# Patient Record
Sex: FEMALE | URBAN - METROPOLITAN AREA
[De-identification: names, ages, dates, MRNs, and addresses within clinical notes are randomized per-mention and may not be internally consistent; named-entity substitution may affect disease eponyms.]

---

## 2017-10-12 ENCOUNTER — EMERGENCY (EMERGENCY)
Facility: HOSPITAL | Age: 32
LOS: 1 days | Discharge: HOME | End: 2017-10-12
Attending: EMERGENCY MEDICINE
Payer: MEDICAID

## 2017-10-12 VITALS
TEMPERATURE: 98 F | SYSTOLIC BLOOD PRESSURE: 114 MMHG | HEIGHT: 66 IN | WEIGHT: 263.89 LBS | OXYGEN SATURATION: 100 % | RESPIRATION RATE: 18 BRPM | DIASTOLIC BLOOD PRESSURE: 68 MMHG | HEART RATE: 67 BPM

## 2017-10-12 PROCEDURE — 99283 EMERGENCY DEPT VISIT LOW MDM: CPT

## 2017-10-12 RX ORDER — LORATADINE 10 MG/1
1 TABLET ORAL
Qty: 15 | Refills: 0 | OUTPATIENT
Start: 2017-10-12 | End: 2017-10-27

## 2017-10-12 RX ORDER — IBUPROFEN 200 MG
600 TABLET ORAL ONCE
Qty: 0 | Refills: 0 | Status: COMPLETED | OUTPATIENT
Start: 2017-10-12 | End: 2017-10-12

## 2017-10-12 RX ORDER — AMOXICILLIN 250 MG/5ML
1 SUSPENSION, RECONSTITUTED, ORAL (ML) ORAL
Qty: 30 | Refills: 0 | OUTPATIENT
Start: 2017-10-12 | End: 2017-10-22

## 2017-10-12 RX ADMIN — Medication 600 MILLIGRAM(S): at 22:12

## 2017-10-12 RX ADMIN — Medication 600 MILLIGRAM(S): at 22:43

## 2017-10-12 NOTE — ED ADULT NURSE NOTE - OBJECTIVE STATEMENT
AOX3 +ambulatory patient reports body aches, cough, headache, nasal congestion and fever x monday. Patient denies any recent travels.

## 2017-10-15 DIAGNOSIS — R05 COUGH: ICD-10-CM

## 2017-10-15 DIAGNOSIS — J32.9 CHRONIC SINUSITIS, UNSPECIFIED: ICD-10-CM

## 2021-04-29 NOTE — ED ADULT NURSE NOTE - CAS ELECT INFOMATION PROVIDED
Has Your Skin Lesion Been Treated?: not been treated
Is This A New Presentation, Or A Follow-Up?: Subcutaneous Nodules
DC instructions

## 2025-05-14 ENCOUNTER — INPATIENT (INPATIENT)
Facility: HOSPITAL | Age: 40
LOS: 0 days | Discharge: HOME | DRG: 392 | End: 2025-05-15
Attending: OBSTETRICS & GYNECOLOGY | Admitting: OBSTETRICS & GYNECOLOGY
Payer: SELF-PAY

## 2025-05-14 VITALS
HEART RATE: 88 BPM | HEIGHT: 65 IN | SYSTOLIC BLOOD PRESSURE: 120 MMHG | DIASTOLIC BLOOD PRESSURE: 88 MMHG | TEMPERATURE: 99 F | OXYGEN SATURATION: 100 % | WEIGHT: 214.95 LBS | RESPIRATION RATE: 18 BRPM

## 2025-05-14 DIAGNOSIS — Z98.84 BARIATRIC SURGERY STATUS: Chronic | ICD-10-CM

## 2025-05-14 DIAGNOSIS — R19.00 INTRA-ABDOMINAL AND PELVIC SWELLING, MASS AND LUMP, UNSPECIFIED SITE: ICD-10-CM

## 2025-05-14 DIAGNOSIS — Z90.3 ACQUIRED ABSENCE OF STOMACH [PART OF]: Chronic | ICD-10-CM

## 2025-05-14 DIAGNOSIS — D25.9 LEIOMYOMA OF UTERUS, UNSPECIFIED: ICD-10-CM

## 2025-05-14 LAB
ALBUMIN SERPL ELPH-MCNC: 3.3 G/DL — LOW (ref 3.5–5)
ALP SERPL-CCNC: 46 U/L — SIGNIFICANT CHANGE UP (ref 40–120)
ALT FLD-CCNC: 15 U/L DA — SIGNIFICANT CHANGE UP (ref 10–60)
ANION GAP SERPL CALC-SCNC: 7 MMOL/L — SIGNIFICANT CHANGE UP (ref 5–17)
ANISOCYTOSIS BLD QL: SLIGHT — SIGNIFICANT CHANGE UP
APPEARANCE UR: CLEAR — SIGNIFICANT CHANGE UP
AST SERPL-CCNC: 17 U/L — SIGNIFICANT CHANGE UP (ref 10–40)
BACTERIA # UR AUTO: NEGATIVE /HPF — SIGNIFICANT CHANGE UP
BASOPHILS # BLD AUTO: 0.02 K/UL — SIGNIFICANT CHANGE UP (ref 0–0.2)
BASOPHILS NFR BLD AUTO: 0.3 % — SIGNIFICANT CHANGE UP (ref 0–2)
BILIRUB SERPL-MCNC: 0.7 MG/DL — SIGNIFICANT CHANGE UP (ref 0.2–1.2)
BILIRUB UR-MCNC: NEGATIVE — SIGNIFICANT CHANGE UP
BUN SERPL-MCNC: 13 MG/DL — SIGNIFICANT CHANGE UP (ref 7–18)
CALCIUM SERPL-MCNC: 8.6 MG/DL — SIGNIFICANT CHANGE UP (ref 8.4–10.5)
CHLORIDE SERPL-SCNC: 105 MMOL/L — SIGNIFICANT CHANGE UP (ref 96–108)
CO2 SERPL-SCNC: 23 MMOL/L — SIGNIFICANT CHANGE UP (ref 22–31)
COLOR SPEC: SIGNIFICANT CHANGE UP
CREAT SERPL-MCNC: 0.7 MG/DL — SIGNIFICANT CHANGE UP (ref 0.5–1.3)
DACRYOCYTES BLD QL SMEAR: SLIGHT — SIGNIFICANT CHANGE UP
DIFF PNL FLD: NEGATIVE — SIGNIFICANT CHANGE UP
EGFR: 111 ML/MIN/1.73M2 — SIGNIFICANT CHANGE UP
EGFR: 111 ML/MIN/1.73M2 — SIGNIFICANT CHANGE UP
ELLIPTOCYTES BLD QL SMEAR: SLIGHT — SIGNIFICANT CHANGE UP
EOSINOPHIL # BLD AUTO: 0.02 K/UL — SIGNIFICANT CHANGE UP (ref 0–0.5)
EOSINOPHIL NFR BLD AUTO: 0.3 % — SIGNIFICANT CHANGE UP (ref 0–6)
EPI CELLS # UR: PRESENT
GLUCOSE SERPL-MCNC: 100 MG/DL — HIGH (ref 70–99)
GLUCOSE UR QL: NEGATIVE MG/DL — SIGNIFICANT CHANGE UP
HCG SERPL-ACNC: <1 MIU/ML — SIGNIFICANT CHANGE UP
HCT VFR BLD CALC: 32.3 % — LOW (ref 34.5–45)
HGB BLD-MCNC: 10.1 G/DL — LOW (ref 11.5–15.5)
HYPOCHROMIA BLD QL: SLIGHT — SIGNIFICANT CHANGE UP
IMM GRANULOCYTES NFR BLD AUTO: 0.3 % — SIGNIFICANT CHANGE UP (ref 0–0.9)
KETONES UR QL: ABNORMAL MG/DL
LEUKOCYTE ESTERASE UR-ACNC: NEGATIVE — SIGNIFICANT CHANGE UP
LIDOCAIN IGE QN: 46 U/L — SIGNIFICANT CHANGE UP (ref 13–75)
LYMPHOCYTES # BLD AUTO: 0.88 K/UL — LOW (ref 1–3.3)
LYMPHOCYTES # BLD AUTO: 11.4 % — LOW (ref 13–44)
MANUAL SMEAR VERIFICATION: SIGNIFICANT CHANGE UP
MCHC RBC-ENTMCNC: 22 PG — LOW (ref 27–34)
MCHC RBC-ENTMCNC: 31.3 G/DL — LOW (ref 32–36)
MCV RBC AUTO: 70.4 FL — LOW (ref 80–100)
MICROCYTES BLD QL: SLIGHT — SIGNIFICANT CHANGE UP
MONOCYTES # BLD AUTO: 1.04 K/UL — HIGH (ref 0–0.9)
MONOCYTES NFR BLD AUTO: 13.4 % — SIGNIFICANT CHANGE UP (ref 2–14)
NEUTROPHILS # BLD AUTO: 5.76 K/UL — SIGNIFICANT CHANGE UP (ref 1.8–7.4)
NEUTROPHILS NFR BLD AUTO: 74.3 % — SIGNIFICANT CHANGE UP (ref 43–77)
NITRITE UR-MCNC: NEGATIVE — SIGNIFICANT CHANGE UP
NRBC BLD AUTO-RTO: 0 /100 WBCS — SIGNIFICANT CHANGE UP (ref 0–0)
PH UR: 6 — SIGNIFICANT CHANGE UP (ref 5–8)
PLAT MORPH BLD: NORMAL — SIGNIFICANT CHANGE UP
PLATELET # BLD AUTO: 296 K/UL — SIGNIFICANT CHANGE UP (ref 150–400)
POIKILOCYTOSIS BLD QL AUTO: SLIGHT — SIGNIFICANT CHANGE UP
POLYCHROMASIA BLD QL SMEAR: SLIGHT — SIGNIFICANT CHANGE UP
POTASSIUM SERPL-MCNC: 3.4 MMOL/L — LOW (ref 3.5–5.3)
POTASSIUM SERPL-SCNC: 3.4 MMOL/L — LOW (ref 3.5–5.3)
PROT SERPL-MCNC: 6.9 G/DL — SIGNIFICANT CHANGE UP (ref 6–8.3)
PROT UR-MCNC: 30 MG/DL
RBC # BLD: 4.59 M/UL — SIGNIFICANT CHANGE UP (ref 3.8–5.2)
RBC # FLD: 17.5 % — HIGH (ref 10.3–14.5)
RBC BLD AUTO: ABNORMAL
RBC CASTS # UR COMP ASSIST: 0 /HPF — SIGNIFICANT CHANGE UP (ref 0–4)
SCHISTOCYTES BLD QL AUTO: SLIGHT — SIGNIFICANT CHANGE UP
SODIUM SERPL-SCNC: 135 MMOL/L — SIGNIFICANT CHANGE UP (ref 135–145)
SP GR SPEC: 1.04 — HIGH (ref 1–1.03)
UROBILINOGEN FLD QL: 1 MG/DL — SIGNIFICANT CHANGE UP (ref 0.2–1)
WBC # BLD: 7.74 K/UL — SIGNIFICANT CHANGE UP (ref 3.8–10.5)
WBC # FLD AUTO: 7.74 K/UL — SIGNIFICANT CHANGE UP (ref 3.8–10.5)
WBC UR QL: 1 /HPF — SIGNIFICANT CHANGE UP (ref 0–5)

## 2025-05-14 PROCEDURE — 99285 EMERGENCY DEPT VISIT HI MDM: CPT

## 2025-05-14 PROCEDURE — 76856 US EXAM PELVIC COMPLETE: CPT | Mod: 26

## 2025-05-14 PROCEDURE — 74177 CT ABD & PELVIS W/CONTRAST: CPT | Mod: 26

## 2025-05-14 PROCEDURE — 76830 TRANSVAGINAL US NON-OB: CPT | Mod: 26

## 2025-05-14 RX ORDER — ACETAMINOPHEN 500 MG/5ML
1000 LIQUID (ML) ORAL ONCE
Refills: 0 | Status: COMPLETED | OUTPATIENT
Start: 2025-05-14 | End: 2025-05-14

## 2025-05-14 RX ORDER — DIATRIZOATE MEGLUMINE, SODIUM 66 %-10 %
30 VIAL (ML) INJECTION ONCE
Refills: 0 | Status: COMPLETED | OUTPATIENT
Start: 2025-05-14 | End: 2025-05-14

## 2025-05-14 RX ORDER — KETOROLAC TROMETHAMINE 30 MG/ML
15 INJECTION, SOLUTION INTRAMUSCULAR; INTRAVENOUS EVERY 6 HOURS
Refills: 0 | Status: DISCONTINUED | OUTPATIENT
Start: 2025-05-14 | End: 2025-05-15

## 2025-05-14 RX ORDER — MAGNESIUM, ALUMINUM HYDROXIDE 200-200 MG
30 TABLET,CHEWABLE ORAL ONCE
Refills: 0 | Status: COMPLETED | OUTPATIENT
Start: 2025-05-14 | End: 2025-05-14

## 2025-05-14 RX ORDER — KETOROLAC TROMETHAMINE 30 MG/ML
15 INJECTION, SOLUTION INTRAMUSCULAR; INTRAVENOUS ONCE
Refills: 0 | Status: DISCONTINUED | OUTPATIENT
Start: 2025-05-14 | End: 2025-05-14

## 2025-05-14 RX ADMIN — Medication 30 MILLILITER(S): at 12:37

## 2025-05-14 RX ADMIN — Medication 1000 MILLIGRAM(S): at 19:53

## 2025-05-14 RX ADMIN — KETOROLAC TROMETHAMINE 15 MILLIGRAM(S): 30 INJECTION, SOLUTION INTRAMUSCULAR; INTRAVENOUS at 14:17

## 2025-05-14 RX ADMIN — KETOROLAC TROMETHAMINE 15 MILLIGRAM(S): 30 INJECTION, SOLUTION INTRAMUSCULAR; INTRAVENOUS at 14:47

## 2025-05-14 RX ADMIN — Medication 400 MILLIGRAM(S): at 19:23

## 2025-05-14 NOTE — H&P ADULT - NSHPPHYSICALEXAM_GEN_ALL_CORE
PE:  Vital Signs Last 24 Hrs  T(C): 37.1 (14 May 2025 10:22), Max: 37.1 (14 May 2025 10:22)  T(F): 98.7 (14 May 2025 10:22), Max: 98.7 (14 May 2025 10:22)  HR: 88 (14 May 2025 10:22) (88 - 88)  BP: 120/88 (14 May 2025 10:22) (120/88 - 120/88)  BP(mean): --  RR: 18 (14 May 2025 10:22) (18 - 18)  SpO2: 100% (14 May 2025 10:22) (100% - 100%)      Gen: alert, oriented, nad   Cardio: RRR, S1, S2   Pulm: CTAB, no labored breathing   Abd: soft, generalized tenderness, worse in upper left and lower left quadrant, palpable mass on left side, non distended, no rebounding, no guarding, +BS diminished in lower quadrants   Speculum: normal external genitalia, normal vaginal appearance, physiologic discharge, pt on menses, cervix appears normal   Bimanual: fixed abnormal countor uterus measuring 22 weeks deviated to R, adnexa not palpable, mild to moderate adnexal tenderness on R side, no CMT  Extremities: no edema

## 2025-05-14 NOTE — H&P ADULT - HISTORY OF PRESENT ILLNESS
HPI:   39y  LMP 25 w/ history of uterine fibroids, gastric sleeve and gastric bypass presents with worsening abdominal pain x 3 days. Pt states it comes and goes but is at baseline constant 7/10 in severity. Pt was evaluated at urgent care yesterday and was told to get a sonogram by her GYN. Pt reports pain was becoming unbearable, 10/10. She tried ibuprofen at home without improvement. Pt reports associated bloating and gas, but states she has had this for years. Pt denies nausea, vomiting, diarrhea, constipation dysuria, fever, chills. Pt has lost 30lbs since  when she started taking Ozempic     Pt was diagnosed with fibroids in 2018 and has been following at a Fibroid Vein clinic. She is scheduled for a uterine fibroid embolization on 25.     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   - pt has never had a mammogram   - pt has never had a colonoscopy   - denies contraceptive use  PMHx: headaches  SHx: Gastric sleeve  Eduardo [place], Gastric bypass  [place]   Meds: Ozempic since 2025 not prescribed   All: cats, cigarette smoke, dust   SocHx: weekend social alcohol use, daily vaping x 2 months, cigarette smoking 20 years ago, denies other drug use   FamHx: ovarian cancer in maternal grandmother   Psych: anxiety and depression previously on medication (Wellbutrin/xanax) and therapy   	      Parameters below as of 14 May 2025 10:22  Patient On (Oxygen Delivery Method): room air        BOWEL: Isela-en-Y gastric bypass. No bowel obstruction. Appendix is not   visualized. No evidence of inflammation in the pericecal region.  PERITONEUM/RETROPERITONEUM: Within normal limits.  VESSELS: Within normal limits.  LYMPH NODES: No lymphadenopathy.  ABDOMINAL WALL: Within normal limits.  BONES: Mild to moderate degenerative findings in the lower lumbar spine.    IMPRESSION:    A very large up to 22cm mass appears to be contiguous with a fibroid   uterus. The appearance is suspect for underlying malignancy, uterine or   potentially ovarian origin, rather than extensive fibroids. Possible   cystic mass involving the left ovary and unusual appearance in the right   adnexa extending cephalad suggesting loculated ascitic fluid or other   lesion. Recommend GYN-oncology consultation.

## 2025-05-14 NOTE — ED ADULT TRIAGE NOTE - CHIEF COMPLAINT QUOTE
walk in with c/o abdominal pain /since Sunday no nausea and vomiting npt went to urgent care yesterday / blood work done hb is 10

## 2025-05-14 NOTE — CONSULT NOTE ADULT - SUBJECTIVE AND OBJECTIVE BOX
HPI:   39y  LMP 25 w/ history of uterine fibroids, gastric sleeve and gastric bypass presents with worsening abdominal pain x 3 days. Pt states it comes and goes but is at baseline constant 7/10 in severity. Pt was evaluated at urgent care yesterday and was told to get a sonogram by her GYN. Pt reports pain was becoming unbearable, 10/10. She tried ibuprofen at home without improvement. Pt reports associated bloating and gas, but states she has had this for years. Pt denies nausea, vomiting, diarrhea, constipation dysuria, fever, chills. Pt has lost 30lbs since  when she started taking Ozempic     Pt was diagnosed with fibroids in 2018 and has been following at a Fibroid Vein clinic. She is scheduled for a uterine fibroid embolization on 25.     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   - pt has never had a mammogram   - pt has never had a colonoscopy   - denies contraceptive use  PMHx: headaches  SHx: Gastric sleeve  Edu[place], Gastric bypass  [place]   Meds: Ozempic since 2025 not prescribed   All: cats, cigarette smoke, dust   SocHx: weekend social alcohol use, daily vaping x 2 months, cigarette smoking 20 years ago, denies other drug use   Psych: anxiety and depression previously on medication (Wellbutrin/xanax) and therapy     REVIEW OF SYSTEMS: see HPI	    PE:  Vital Signs Last 24 Hrs  T(C): 37.1 (14 May 2025 10:22), Max: 37.1 (14 May 2025 10:22)  T(F): 98.7 (14 May 2025 10:22), Max: 98.7 (14 May 2025 10:22)  HR: 88 (14 May 2025 10:22) (88 - 88)  BP: 120/88 (14 May 2025 10:22) (120/88 - 120/88)  BP(mean): --  RR: 18 (14 May 2025 10:22) (18 - 18)  SpO2: 100% (14 May 2025 10:22) (100% - 100%)    Parameters below as of 14 May 2025 10:22  Patient On (Oxygen Delivery Method): room air    Gen: alert, oriented, nad   Cardio: RRR, S1, S2   Pulm: CTAB, no labored breathing   Abd: soft, generalized tenderness, worse in upper left and lower left quadrant, palpable mass on left side, non distended, no rebounding, no guarding, +BS diminished in lower quadrants   Speculum: normal external genitalia, normal vaginal appearance, physiologic discharge, pt on menses, cervix appears normal   Bimanual: fixed abnormal countor uterus measuring 22 weeks deviated to R, adnexa not palpable, mild to moderate adnexal tenderness on R side, no CMT  Extremities: no edema      LABS:                        10.1   7.74  )-----------( 296      ( 14 May 2025 11:35 )             32.3         135  |  105  |  13  ----------------------------<  100[H]  3.4[L]   |  23  |  0.70    Ca    8.6      14 May 2025 11:35    TPro  6.9  /  Alb  3.3[L]  /  TBili  0.7  /  DBili  x   /  AST  17  /  ALT  15  /  AlkPhos  46  05-14      Urinalysis Basic - ( 14 May 2025 11:35 )    Color: Dark Yellow / Appearance: Clear / S.038 / pH: x  Gluc: 100 mg/dL / Ketone: x  / Bili: Negative / Urobili: 1.0 mg/dL   Blood: x / Protein: 30 mg/dL / Nitrite: Negative   Leuk Esterase: Negative / RBC: 0 /HPF / WBC 1 /HPF   Sq Epi: x / Non Sq Epi: x / Bacteria: Negative /HPF  Lipase: 46 U/L (25 @ 11:35)  HCG Quantitative, Serum: <1  RADIOLOGY & ADDITIONAL STUDIES:  CT Abdomen and Pelvis w/ Oral Cont and w/ IV Cont (25 @ 14:15)  FINDINGS:  LOWER CHEST: Within normal limits.    LIVER: Within normal limits.  BILE DUCTS: Normal caliber.  GALLBLADDER: Within normal limits.  SPLEEN: Within normal limits.  PANCREAS: Within normal limits.  ADRENALS: Within normal limits.  KIDNEYS/URETERS: Within normal limits.    BLADDER: Minimally distended.  REPRODUCTIVE ORGANS: A very large heterogeneous mass probably contiguous   with the uterus extends up out of the pelvis into the right upper   quadrant abutting the inferior surface of the right lobe of the liver.   This mass measures about 16 x 9 x 23 cm. In the left adnexal region there   is an approximately 5.0 x 3.5 cm partially cystic structure series 2   image 94. In the right adnexal region extending cephalad there is an   unusual low-attenuation cystic lesion or loculated fluid surrounding the   right ovarian vein which is tortuous such as series 2 image 90. An   approximately 3.5 cm rim calcified lesion in the uterus itself series 2   image 114 is compatible with a fibroid.    BOWEL: Isela-en-Y gastric bypass. No bowel obstruction. Appendix is not   visualized. No evidence of inflammation in the pericecal region.  PERITONEUM/RETROPERITONEUM: Within normal limits.  VESSELS: Within normal limits.  LYMPH NODES: No lymphadenopathy.  ABDOMINAL WALL: Within normal limits.  BONES: Mild to moderate degenerative findings in the lower lumbar spine.    IMPRESSION:    A very large up to 22cm mass appears to be contiguous with a fibroid   uterus. The appearance is suspect for underlying malignancy, uterine or   potentially ovarian origin, rather than extensive fibroids. Possible   cystic mass involving the left ovary and unusual appearance in the right   adnexa extending cephalad suggesting loculated ascitic fluid or other   lesion. Recommend GYN-oncology consultation.

## 2025-05-14 NOTE — ED PROVIDER NOTE - CLINICAL SUMMARY MEDICAL DECISION MAKING FREE TEXT BOX
39-year-old female presenting with mid epigastric pain and left lower quadrant pain history of gastric bypass.  Symptoms may suggest gastritis, fibroids, pancreatitis less likely gallstones or UTI among other causes.  Plan to perform labs urinalysis, CT abdomen pelvis and reassess.

## 2025-05-14 NOTE — ED PROVIDER NOTE - PROGRESS NOTE DETAILS
Consulted GYN team spoke with CHERIE Mendieta.  Patient seen and evaluated by GYN PA and attending.  Recommended sonogram pelvis.  Will advise disposition afterwards. Discussed results of labs and imaging with patient.  Discussed concern for cancer.  Discussed GYN team consultation. patient signed out to me pending US and gyn recs. plan for gyn admission. Viet Herrera

## 2025-05-14 NOTE — PATIENT PROFILE ADULT - DEAF OR HARD OF HEARING?
Up to recliner. Up to bathroom to void. Cont to have mild cramps but improved after meds. Min bleeding. On peripad. ludin with pt postop. Instructions given and no further questions. Doing well. Dc home. Pt noted to have a small seed sized blood blister under right eye. Monique dullinger brown crna in to see bruise. Enc to ice and if any further issues call or come in. No visual disturbance. States she bruises easily. Doing well. No further needs from this concern. Dc home and iv dc.   no

## 2025-05-14 NOTE — ED PROVIDER NOTE - OBJECTIVE STATEMENT
39-year-old female history of gastric bypass 2021, uterine fibroids presenting with mid upper abdominal pain since Sunday.  Also relates left lower abdominal pain that radiates to back over same time.  Went to urgent care yesterday and told that her blood work was "low".  Relates decreased appetite.  Has been using her friend's Ozempic on and off since January last took it 2 weeks ago.  Denies any vomiting or diarrhea.  Denies any vaginal bleeding.

## 2025-05-14 NOTE — H&P ADULT - NSHPLABSRESULTS_GEN_ALL_CORE
LABS:                        10.1   7.74  )-----------( 296      ( 14 May 2025 11:35 )             32.3         135  |  105  |  13  ----------------------------<  100[H]  3.4[L]   |  23  |  0.70    Ca    8.6      14 May 2025 11:35    TPro  6.9  /  Alb  3.3[L]  /  TBili  0.7  /  DBili  x   /  AST  17  /  ALT  15  /  AlkPhos  46        Urinalysis Basic - ( 14 May 2025 11:35 )    Color: Dark Yellow / Appearance: Clear / S.038 / pH: x  Gluc: 100 mg/dL / Ketone: x  / Bili: Negative / Urobili: 1.0 mg/dL   Blood: x / Protein: 30 mg/dL / Nitrite: Negative   Leuk Esterase: Negative / RBC: 0 /HPF / WBC 1 /HPF   Sq Epi: x / Non Sq Epi: x / Bacteria: Negative /HPF  Lipase: 46 U/L (25 @ 11:35)  HCG Quantitative, Serum: <1  RADIOLOGY & ADDITIONAL STUDIES:  CT Abdomen and Pelvis w/ Oral Cont and w/ IV Cont (25 @ 14:15)  FINDINGS:  LOWER CHEST: Within normal limits.    LIVER: Within normal limits.  BILE DUCTS: Normal caliber.  GALLBLADDER: Within normal limits.  SPLEEN: Within normal limits.  PANCREAS: Within normal limits.  ADRENALS: Within normal limits.  KIDNEYS/URETERS: Within normal limits.    BLADDER: Minimally distended.  REPRODUCTIVE ORGANS: A very large heterogeneous mass probably contiguous   with the uterus extends up out of the pelvis into the right upper   quadrant abutting the inferior surface of the right lobe of the liver.   This mass measures about 16 x 9 x 23 cm. In the left adnexal region there   is an approximately 5.0 x 3.5 cm partially cystic structure series 2   image 94. In the right adnexal region extending cephalad there is an   unusual low-attenuation cystic lesion or loculated fluid surrounding the   right ovarian vein which is tortuous such as series 2 image 90. An   approximately 3.5 cm rim calcified lesion in the uterus itself series 2   image 114 is compatible with a fibroid.

## 2025-05-14 NOTE — ED ADULT NURSE NOTE - NSFALLUNIVINTERV_ED_ALL_ED
Bed/Stretcher in lowest position, wheels locked, appropriate side rails in place/Call bell, personal items and telephone in reach/Instruct patient to call for assistance before getting out of bed/chair/stretcher/Non-slip footwear applied when patient is off stretcher/[place] to call system/Physically safe environment - no spills, clutter or unnecessary equipment/Purposeful proactive rounding/Room/bathroom lighting operational, light cord in reach

## 2025-05-14 NOTE — CONSULT NOTE ADULT - ASSESSMENT
Pt is 39 year old G0 LMP 4/19/25 with history of uterine fibroids, gastric sleeve sx, and gastric bypass with large pelvic mass and abdominal pain. Pt is hemodynamically stable.     Differential dx includes but is not limited to degenerating fibroid, ruptured ovarian cysts, ovarian torsion, kidney stone, malignancy.      Recommend ultrasound to further distinguish mass. Will consult Gyn/onc to determine need for admission and work up.     Pt see with Dr. Barahona

## 2025-05-14 NOTE — H&P ADULT - ASSESSMENT
Pt is 39 year old G0 LMP 4/19/25 with history of uterine fibroids, gastric sleeve sx, and gastric bypass with large pelvic mass and abdominal pain. Pt is hemodynamically stable.     Admit to GYN Dr. Barahona for pain management and pelvic mass follow up with GYN oncology   -Regular Diet   -Pain management: Iv Tylenol and Toradol   -DVT Prophylaxis: venodynes, ambulation   -F/u results of ultrasound   -Answered all patient questions, discussed patient status at length     Pt seen with Dr. Barahona

## 2025-05-15 ENCOUNTER — TRANSCRIPTION ENCOUNTER (OUTPATIENT)
Age: 40
End: 2025-05-15

## 2025-05-15 VITALS
SYSTOLIC BLOOD PRESSURE: 109 MMHG | RESPIRATION RATE: 18 BRPM | TEMPERATURE: 98 F | OXYGEN SATURATION: 95 % | HEART RATE: 84 BPM | DIASTOLIC BLOOD PRESSURE: 67 MMHG

## 2025-05-15 DIAGNOSIS — D25.9 LEIOMYOMA OF UTERUS, UNSPECIFIED: ICD-10-CM

## 2025-05-15 PROCEDURE — 96375 TX/PRO/DX INJ NEW DRUG ADDON: CPT

## 2025-05-15 PROCEDURE — 96374 THER/PROPH/DIAG INJ IV PUSH: CPT

## 2025-05-15 PROCEDURE — 85025 COMPLETE CBC W/AUTO DIFF WBC: CPT

## 2025-05-15 PROCEDURE — 83690 ASSAY OF LIPASE: CPT

## 2025-05-15 PROCEDURE — 84702 CHORIONIC GONADOTROPIN TEST: CPT

## 2025-05-15 PROCEDURE — 81001 URINALYSIS AUTO W/SCOPE: CPT

## 2025-05-15 PROCEDURE — 99285 EMERGENCY DEPT VISIT HI MDM: CPT | Mod: 25

## 2025-05-15 PROCEDURE — 80053 COMPREHEN METABOLIC PANEL: CPT

## 2025-05-15 PROCEDURE — 74177 CT ABD & PELVIS W/CONTRAST: CPT

## 2025-05-15 PROCEDURE — 36415 COLL VENOUS BLD VENIPUNCTURE: CPT

## 2025-05-15 PROCEDURE — 76856 US EXAM PELVIC COMPLETE: CPT

## 2025-05-15 PROCEDURE — 76830 TRANSVAGINAL US NON-OB: CPT

## 2025-05-15 RX ORDER — ACETAMINOPHEN 500 MG/5ML
2 LIQUID (ML) ORAL
Qty: 30 | Refills: 0
Start: 2025-05-15 | End: 2025-05-19

## 2025-05-15 RX ORDER — IBUPROFEN 200 MG
1 TABLET ORAL
Qty: 40 | Refills: 0
Start: 2025-05-15 | End: 2025-05-24

## 2025-05-15 RX ORDER — IBUPROFEN 200 MG
1 TABLET ORAL
Qty: 20 | Refills: 0
Start: 2025-05-15 | End: 2025-05-19

## 2025-05-15 RX ORDER — IBUPROFEN 200 MG
1 TABLET ORAL
Refills: 0
Start: 2025-05-15

## 2025-05-15 RX ADMIN — KETOROLAC TROMETHAMINE 15 MILLIGRAM(S): 30 INJECTION, SOLUTION INTRAMUSCULAR; INTRAVENOUS at 09:27

## 2025-05-15 RX ADMIN — Medication 4 MILLIGRAM(S): at 05:30

## 2025-05-15 RX ADMIN — Medication 20 MILLIGRAM(S): at 12:02

## 2025-05-15 RX ADMIN — Medication 4 MILLIGRAM(S): at 04:57

## 2025-05-15 RX ADMIN — KETOROLAC TROMETHAMINE 15 MILLIGRAM(S): 30 INJECTION, SOLUTION INTRAMUSCULAR; INTRAVENOUS at 10:05

## 2025-05-15 NOTE — DISCHARGE NOTE PROVIDER - NSDCCPCAREPLAN_GEN_ALL_CORE_FT
PRINCIPAL DISCHARGE DIAGNOSIS  Diagnosis: Fibroid uterus  Assessment and Plan of Treatment:       SECONDARY DISCHARGE DIAGNOSES  Diagnosis: Abdominal pain  Assessment and Plan of Treatment:

## 2025-05-15 NOTE — PROGRESS NOTE ADULT - PROBLEM SELECTOR PLAN 1
- At this time, no acute inpatient GYN intervention is warranted  - Pts pain improves with pain meds  - Pts vitals are stable  - Discussed with patient that next step in her care is to undergo UFE, patient states she would like to have her surgery done at this hospital.  - Explained to patient that while UFE's are not performed here, she can be seen at [facility] on [facility] to discuss other options for her care such as myomectomy/hysterectomy (pt reports she does not want to get pregnant in the future)  - Pt stable to be discharged home today with outpatient follow up  - Discussed with Dr. Guillermo - At this time, no acute inpatient GYN intervention is warranted  - Pts pain improves with pain meds  - Pts vitals are stable  - Discussed with patient that next step in her care is to undergo UFE, patient states she would like to have her surgery done at this hospital. Pt aware that this institution does not perform UFEs and encouraged to follow up with her outpatient obgyn.  - Pt stable to be discharged home today with outpatient follow up  - Discussed with Dr. Guillermo

## 2025-05-15 NOTE — PROGRESS NOTE ADULT - SUBJECTIVE AND OBJECTIVE BOX
PA progress note    Pt seen at bedside this morning.  Resting comfortably, reports her menses came overnight. No other overnight events.  Pt reports pain medications have improved her abdominal pain.  Denies fever, chills, worsening abdominal pain, abnormal vaginal discharge, n,v,d,c, or sob  Pt is scheduled for uterine fibroid embolization on 25.    Vital Signs Last 24 Hrs  T(C): 36.9 (15 May 2025 05:09), Max: 37.4 (14 May 2025 19:10)  T(F): 98.5 (15 May 2025 05:09), Max: 99.3 (14 May 2025 19:10)  HR: 67 (15 May 2025 05:09) (67 - 89)  BP: 117/77 (15 May 2025 05:09) (117/77 - 124/77)  BP(mean): --  RR: 18 (15 May 2025 05:09) (18 - 18)  SpO2: 97% (15 May 2025 05:09) (97% - 100%)    Parameters below as of 15 May 2025 05:09  Patient On (Oxygen Delivery Method): room air    physical exam:  gen: A&O x3, NAD  abd: soft, non tender to palpation at this time, non distended, no rigidity or guarding. palpable mass left abdomen.  extrem: soft, non tender, no swelling/varicosities/or color changes noted  gyn: deferred                          10.1     )-----------( 296      ( 14 May 2025 11:35 )             32.3   05-14    135  |  105  |  13  ----------------------------<  100[H]  3.4[L]   |  23  |  0.70    Ca    8.6      14 May 2025 11:35    TPro  6.9  /  Alb  3.3[L]  /  TBili  0.7  /  DBili  x   /  AST  17  /  ALT  15  /  AlkPhos  46  05-14  Labs, Radiology, Cardiology, and Other Results: LABS:                        10.1     )-----------( 296      ( 14 May 2025 11:35 )             32.3     05-14    135  |  105  |  13  ----------------------------<  100[H]  3.4[L]   |  23  |  0.70    Ca    8.6      14 May 2025 11:35    TPro  6.9  /  Alb  3.3[L]  /  TBili  0.7  /  DBili  x   /  AST  17  /  ALT  15  /  AlkPhos  46  05-14      Urinalysis Basic - ( 14 May 2025 11:35 )    Color: Dark Yellow / Appearance: Clear / S.038 / pH: x  Gluc: 100 mg/dL / Ketone: x  / Bili: Negative / Urobili: 1.0 mg/dL   Blood: x / Protein: 30 mg/dL / Nitrite: Negative   Leuk Esterase: Negative / RBC: 0 /HPF / WBC 1 /HPF   Sq Epi: x / Non Sq Epi: x / Bacteria: Negative /HPF  Lipase: 46 U/L (25 @ 11:35)  HCG Quantitative, Serum: <1      RADIOLOGY & ADDITIONAL STUDIES:  CT Abdomen and Pelvis w/ Oral Cont and w/ IV Cont (25 @ 14:15)  FINDINGS:  LOWER CHEST: Within normal limits.    LIVER: Within normal limits.  BILE DUCTS: Normal caliber.  GALLBLADDER: Within normal limits.  SPLEEN: Within normal limits.  PANCREAS: Within normal limits.  ADRENALS: Within normal limits.  KIDNEYS/URETERS: Within normal limits.    BLADDER: Minimally distended.  REPRODUCTIVE ORGANS: A very large heterogeneous mass probably contiguous   with the uterus extends up out of the pelvis into the right upper   quadrant abutting the inferior surface of the right lobe of the liver.   This mass measures about 16 x 9 x 23 cm. In the left adnexal region there   is an approximately 5.0 x 3.5 cm partially cystic structure series 2   image 94. In the right adnexal region extending cephalad there is an   unusual low-attenuation cystic lesion or loculated fluid surrounding the   right ovarian vein which is tortuous such as series 2 image 90. An   approximately 3.5 cm rim calcified lesion in the uterus itself series 2   image 114 is compatible with a fibroid.    PROCEDURE DATE:  2025          INTERPRETATION:  CLINICAL INFORMATION: Fibroids. Pelvic pain.    LMP: 2025    COMPARISON: None available.    TECHNIQUE:  Endovaginal and transabdominal pelvic sonogram. Color and Spectral   Doppler was performed.    FINDINGS:  Technically difficult study.  Uterus: 22.3 cm x 11.7 cm x 18.8 cm. Multiple leiomyomas, including a   fundal leiomyoma, measuring 11.0 x 9.1 x 8.9 cm., A uterine body   leiomyoma, measuring 9.7 x 9.4 x 8.0 cm, and a peripherally calcified   lower uterine segment leiomyoma, measuring 4.0 x 3.9 x 3.6 cm.  Endometrium: 7 mm. Not well visualized.    Right ovary: 5.9 cm x 2.4 cm x 4.4 cm. Cystic structure in the right   adnexa, measuring 4.2 x 1.5 x 7.4 cm Normal arterial and venous waveforms.  Left ovary: 4.5 cm x 2.5 cm x 4.0 cm. Within normal limits. Normal   arterial and venous waveforms.    Fluid: None.    IMPRESSION:  Technically difficult study due to large uterine leiomyomas.    Cystic structure in the right adnexa, of uncertain etiology. Further   evaluation with contrast-enhanced MRI pelvis is recommended.    --- End of Report ---

## 2025-05-15 NOTE — PROGRESS NOTE ADULT - PROBLEM SELECTOR PLAN 2
- At this time, no acute inpatient GYN intervention is warranted  - Pts pain improves with pain meds  - Pts vitals are stable  - Discussed with patient that next step in her care is to undergo UFE, patient states she would like to have her surgery done at this hospital.  - Explained to patient that while UFE's are not performed here, she can be seen at [facility] on [facility] to discuss other options for her care such as myomectomy/hysterectomy (pt reports she does not want to get pregnant in the future)  - Pt stable to be discharged home today with outpatient follow up  - Discussed with Dr. Guillermo - At this time, no acute inpatient GYN intervention is warranted  - Pts pain improves with pain meds  - Pts vitals are stable  - Discussed with patient that next step in her care is to undergo UFE, patient states she would like to have her surgery done at this hospital. Pt aware that this institution does not perform UFEs and encouraged to follow up with her outpatient obgyn.  - Pt stable to be discharged home today with outpatient follow up  - Discussed with Dr. Guillermo

## 2025-05-15 NOTE — DISCHARGE NOTE PROVIDER - NSDCACTIVITY_GEN_ALL_CORE
Return to Work/School allowed/Showering allowed/Stairs allowed/Driving allowed/Walking - Indoors allowed/Walking - Outdoors allowed/Activity as tolerated

## 2025-05-15 NOTE — DISCHARGE NOTE PROVIDER - HOSPITAL COURSE
Pt admitted for pain control and pelvic mass workup.  Pain controlled overnight with medications, CT/sonogram findings benign  Pt stable for discharge home with outpt follow up with her primary obgyn.

## 2025-05-15 NOTE — DISCHARGE NOTE NURSING/CASE MANAGEMENT/SOCIAL WORK - FINANCIAL ASSISTANCE
[facility] provides services at a reduced cost to those who are determined to be eligible through [facility]’s financial assistance program. Information regarding [facility]’s financial assistance program can be found by going to [email or web address] or by calling [phone].

## 2025-05-15 NOTE — DISCHARGE NOTE PROVIDER - CARE PROVIDER_API CALL
Nilsa Guillermo  Obstetrics and Gynecology  [address] [place] [address]  Phone: [phone]  Fax: [phone]  Follow Up Time:

## 2025-05-15 NOTE — DISCHARGE NOTE NURSING/CASE MANAGEMENT/SOCIAL WORK - PATIENT PORTAL LINK FT
You can access the FollowMyHealth Patient Portal offered by [facility] by registering at the following website: [email or web address]. By joining [facility]’s FollowMyHealth portal, you will also be able to view your health information using other applications (apps) compatible with our system.

## 2025-07-08 PROBLEM — D21.9 BENIGN NEOPLASM OF CONNECTIVE AND OTHER SOFT TISSUE, UNSPECIFIED: Chronic | Status: ACTIVE | Noted: 2025-05-14

## 2025-07-10 ENCOUNTER — APPOINTMENT (OUTPATIENT)
Dept: GYNECOLOGIC ONCOLOGY | Facility: CLINIC | Age: 40
End: 2025-07-10
Payer: COMMERCIAL

## 2025-07-10 ENCOUNTER — NON-APPOINTMENT (OUTPATIENT)
Age: 40
End: 2025-07-10

## 2025-07-10 ENCOUNTER — APPOINTMENT (OUTPATIENT)
Dept: SURGERY | Facility: AMBULATORY SURGERY CENTER | Age: 40
End: 2025-07-10

## 2025-07-10 VITALS — BODY MASS INDEX: 34.55 KG/M2 | WEIGHT: 215 LBS | HEIGHT: 66 IN

## 2025-07-10 VITALS — HEIGHT: 66 IN | WEIGHT: 215 LBS | BODY MASS INDEX: 34.55 KG/M2

## 2025-07-10 PROCEDURE — 99203 OFFICE O/P NEW LOW 30 MIN: CPT

## 2025-07-10 PROCEDURE — 99204 OFFICE O/P NEW MOD 45 MIN: CPT

## 2025-07-10 PROCEDURE — 99459 PELVIC EXAMINATION: CPT

## 2025-07-11 ENCOUNTER — NON-APPOINTMENT (OUTPATIENT)
Age: 40
End: 2025-07-11

## 2025-07-21 ENCOUNTER — OUTPATIENT (OUTPATIENT)
Dept: OUTPATIENT SERVICES | Facility: HOSPITAL | Age: 40
LOS: 1 days | End: 2025-07-21
Payer: COMMERCIAL

## 2025-07-21 VITALS
OXYGEN SATURATION: 100 % | HEIGHT: 66 IN | TEMPERATURE: 98 F | SYSTOLIC BLOOD PRESSURE: 156 MMHG | HEART RATE: 61 BPM | DIASTOLIC BLOOD PRESSURE: 104 MMHG | WEIGHT: 220.02 LBS | RESPIRATION RATE: 18 BRPM

## 2025-07-21 DIAGNOSIS — Z01.818 ENCOUNTER FOR OTHER PREPROCEDURAL EXAMINATION: ICD-10-CM

## 2025-07-21 DIAGNOSIS — R19.00 INTRA-ABDOMINAL AND PELVIC SWELLING, MASS AND LUMP, UNSPECIFIED SITE: ICD-10-CM

## 2025-07-21 DIAGNOSIS — Z90.3 ACQUIRED ABSENCE OF STOMACH [PART OF]: Chronic | ICD-10-CM

## 2025-07-21 DIAGNOSIS — Z98.84 BARIATRIC SURGERY STATUS: Chronic | ICD-10-CM

## 2025-07-21 LAB
ALBUMIN SERPL ELPH-MCNC: 4.2 G/DL — SIGNIFICANT CHANGE UP (ref 3.5–5.2)
ALP SERPL-CCNC: 49 U/L — SIGNIFICANT CHANGE UP (ref 30–115)
ALT FLD-CCNC: 13 U/L — SIGNIFICANT CHANGE UP (ref 0–41)
ANION GAP SERPL CALC-SCNC: 12 MMOL/L — SIGNIFICANT CHANGE UP (ref 7–14)
AST SERPL-CCNC: 18 U/L — SIGNIFICANT CHANGE UP (ref 0–41)
BASOPHILS # BLD AUTO: 0 K/UL — SIGNIFICANT CHANGE UP (ref 0–0.2)
BASOPHILS NFR BLD AUTO: 0 % — SIGNIFICANT CHANGE UP (ref 0–1)
BILIRUB SERPL-MCNC: 0.4 MG/DL — SIGNIFICANT CHANGE UP (ref 0.2–1.2)
BUN SERPL-MCNC: 10 MG/DL — SIGNIFICANT CHANGE UP (ref 10–20)
CALCIUM SERPL-MCNC: 9.3 MG/DL — SIGNIFICANT CHANGE UP (ref 8.4–10.5)
CHLORIDE SERPL-SCNC: 105 MMOL/L — SIGNIFICANT CHANGE UP (ref 98–110)
CO2 SERPL-SCNC: 24 MMOL/L — SIGNIFICANT CHANGE UP (ref 17–32)
CREAT SERPL-MCNC: 0.8 MG/DL — SIGNIFICANT CHANGE UP (ref 0.7–1.5)
EGFR: 95 ML/MIN/1.73M2 — SIGNIFICANT CHANGE UP
EGFR: 95 ML/MIN/1.73M2 — SIGNIFICANT CHANGE UP
EOSINOPHIL # BLD AUTO: 0.19 K/UL — SIGNIFICANT CHANGE UP (ref 0–0.7)
EOSINOPHIL NFR BLD AUTO: 4 % — SIGNIFICANT CHANGE UP (ref 0–8)
GLUCOSE SERPL-MCNC: 78 MG/DL — SIGNIFICANT CHANGE UP (ref 70–99)
HCT VFR BLD CALC: 35.8 % — LOW (ref 37–47)
HGB BLD-MCNC: 10.9 G/DL — LOW (ref 12–16)
LYMPHOCYTES # BLD AUTO: 0.96 K/UL — LOW (ref 1–3.3)
LYMPHOCYTES # BLD AUTO: 20 % — SIGNIFICANT CHANGE UP (ref 13–44)
MCHC RBC-ENTMCNC: 22.5 PG — LOW (ref 27–31)
MCHC RBC-ENTMCNC: 30.4 G/DL — LOW (ref 32–37)
MCV RBC AUTO: 74 FL — LOW (ref 81–99)
MONOCYTES # BLD AUTO: 0.91 K/UL — HIGH (ref 0.1–0.6)
MONOCYTES NFR BLD AUTO: 19 % — HIGH (ref 1.7–9.3)
NEUTROPHILS # BLD AUTO: 2.72 K/UL — SIGNIFICANT CHANGE UP (ref 1.4–6.5)
NEUTROPHILS NFR BLD AUTO: 57 % — SIGNIFICANT CHANGE UP (ref 42.2–75.2)
NRBC BLD AUTO-RTO: SIGNIFICANT CHANGE UP /100 WBCS (ref 0–0)
PLATELET # BLD AUTO: 344 K/UL — SIGNIFICANT CHANGE UP (ref 130–400)
PMV BLD: 10 FL — SIGNIFICANT CHANGE UP (ref 7.4–10.4)
POTASSIUM SERPL-MCNC: 4.7 MMOL/L — SIGNIFICANT CHANGE UP (ref 3.5–5)
POTASSIUM SERPL-SCNC: 4.7 MMOL/L — SIGNIFICANT CHANGE UP (ref 3.5–5)
PROT SERPL-MCNC: 6.7 G/DL — SIGNIFICANT CHANGE UP (ref 6–8)
RBC # BLD: 4.84 M/UL — SIGNIFICANT CHANGE UP (ref 4.2–5.4)
RBC # FLD: 17.6 % — HIGH (ref 11.5–14.5)
SODIUM SERPL-SCNC: 141 MMOL/L — SIGNIFICANT CHANGE UP (ref 135–146)
WBC # BLD: 4.78 K/UL — LOW (ref 4.8–10.8)
WBC # FLD AUTO: 4.78 K/UL — LOW (ref 4.8–10.8)

## 2025-07-21 PROCEDURE — 93010 ELECTROCARDIOGRAM REPORT: CPT

## 2025-07-21 PROCEDURE — 80053 COMPREHEN METABOLIC PANEL: CPT

## 2025-07-21 PROCEDURE — 36415 COLL VENOUS BLD VENIPUNCTURE: CPT

## 2025-07-21 PROCEDURE — 86901 BLOOD TYPING SEROLOGIC RH(D): CPT

## 2025-07-21 PROCEDURE — 86900 BLOOD TYPING SEROLOGIC ABO: CPT

## 2025-07-21 PROCEDURE — 85025 COMPLETE CBC W/AUTO DIFF WBC: CPT

## 2025-07-21 PROCEDURE — 86850 RBC ANTIBODY SCREEN: CPT

## 2025-07-21 PROCEDURE — 99214 OFFICE O/P EST MOD 30 MIN: CPT | Mod: 25

## 2025-07-21 PROCEDURE — 93005 ELECTROCARDIOGRAM TRACING: CPT

## 2025-07-21 RX ORDER — FUROSEMIDE 10 MG/ML
0 INJECTION INTRAMUSCULAR; INTRAVENOUS
Refills: 0 | DISCHARGE

## 2025-07-21 RX ORDER — FUROSEMIDE 10 MG/ML
1 INJECTION INTRAMUSCULAR; INTRAVENOUS
Refills: 0 | DISCHARGE

## 2025-07-22 DIAGNOSIS — Z01.818 ENCOUNTER FOR OTHER PREPROCEDURAL EXAMINATION: ICD-10-CM

## 2025-07-22 DIAGNOSIS — R19.00 INTRA-ABDOMINAL AND PELVIC SWELLING, MASS AND LUMP, UNSPECIFIED SITE: ICD-10-CM

## 2025-07-23 ENCOUNTER — RESULT REVIEW (OUTPATIENT)
Age: 40
End: 2025-07-23

## 2025-07-23 ENCOUNTER — TRANSCRIPTION ENCOUNTER (OUTPATIENT)
Age: 40
End: 2025-07-23

## 2025-07-23 ENCOUNTER — APPOINTMENT (OUTPATIENT)
Dept: GYNECOLOGIC ONCOLOGY | Facility: HOSPITAL | Age: 40
End: 2025-07-23
Payer: COMMERCIAL

## 2025-07-23 ENCOUNTER — APPOINTMENT (OUTPATIENT)
Dept: SURGERY | Facility: HOSPITAL | Age: 40
End: 2025-07-23

## 2025-07-23 ENCOUNTER — OUTPATIENT (OUTPATIENT)
Dept: OUTPATIENT SERVICES | Facility: HOSPITAL | Age: 40
LOS: 1 days | Discharge: HOME | End: 2025-07-23
Payer: COMMERCIAL

## 2025-07-23 VITALS
SYSTOLIC BLOOD PRESSURE: 160 MMHG | OXYGEN SATURATION: 100 % | RESPIRATION RATE: 20 BRPM | HEART RATE: 62 BPM | TEMPERATURE: 98 F | DIASTOLIC BLOOD PRESSURE: 88 MMHG

## 2025-07-23 VITALS
HEART RATE: 60 BPM | HEIGHT: 66 IN | WEIGHT: 225.09 LBS | RESPIRATION RATE: 15 BRPM | DIASTOLIC BLOOD PRESSURE: 95 MMHG | SYSTOLIC BLOOD PRESSURE: 163 MMHG | TEMPERATURE: 98 F | OXYGEN SATURATION: 99 %

## 2025-07-23 DIAGNOSIS — K82.9 DISEASE OF GALLBLADDER, UNSPECIFIED: ICD-10-CM

## 2025-07-23 DIAGNOSIS — R19.00 INTRA-ABDOMINAL AND PELVIC SWELLING, MASS AND LUMP, UNSPECIFIED SITE: ICD-10-CM

## 2025-07-23 DIAGNOSIS — Z98.84 BARIATRIC SURGERY STATUS: Chronic | ICD-10-CM

## 2025-07-23 DIAGNOSIS — Z90.3 ACQUIRED ABSENCE OF STOMACH [PART OF]: Chronic | ICD-10-CM

## 2025-07-23 LAB
ALBUMIN SERPL ELPH-MCNC: 4.3 G/DL — SIGNIFICANT CHANGE UP (ref 3.5–5.2)
ALP SERPL-CCNC: 50 U/L — SIGNIFICANT CHANGE UP (ref 30–115)
ALT FLD-CCNC: 39 U/L — SIGNIFICANT CHANGE UP (ref 0–41)
ANION GAP SERPL CALC-SCNC: 10 MMOL/L — SIGNIFICANT CHANGE UP (ref 7–14)
AST SERPL-CCNC: 68 U/L — HIGH (ref 0–41)
BASOPHILS # BLD AUTO: 0.01 K/UL — SIGNIFICANT CHANGE UP (ref 0–0.2)
BASOPHILS NFR BLD AUTO: 0.1 % — SIGNIFICANT CHANGE UP (ref 0–1)
BILIRUB SERPL-MCNC: 0.4 MG/DL — SIGNIFICANT CHANGE UP (ref 0.2–1.2)
BUN SERPL-MCNC: 11 MG/DL — SIGNIFICANT CHANGE UP (ref 10–20)
CALCIUM SERPL-MCNC: 9.1 MG/DL — SIGNIFICANT CHANGE UP (ref 8.4–10.5)
CHLORIDE SERPL-SCNC: 104 MMOL/L — SIGNIFICANT CHANGE UP (ref 98–110)
CO2 SERPL-SCNC: 25 MMOL/L — SIGNIFICANT CHANGE UP (ref 17–32)
CREAT SERPL-MCNC: 0.8 MG/DL — SIGNIFICANT CHANGE UP (ref 0.7–1.5)
EGFR: 95 ML/MIN/1.73M2 — SIGNIFICANT CHANGE UP
EGFR: 95 ML/MIN/1.73M2 — SIGNIFICANT CHANGE UP
EOSINOPHIL # BLD AUTO: 0.01 K/UL — SIGNIFICANT CHANGE UP (ref 0–0.7)
EOSINOPHIL NFR BLD AUTO: 0.1 % — SIGNIFICANT CHANGE UP (ref 0–8)
GLUCOSE SERPL-MCNC: 125 MG/DL — HIGH (ref 70–99)
HCT VFR BLD CALC: 33.7 % — LOW (ref 37–47)
HGB BLD-MCNC: 10.5 G/DL — LOW (ref 12–16)
IMM GRANULOCYTES NFR BLD AUTO: 0.4 % — HIGH (ref 0.1–0.3)
LYMPHOCYTES # BLD AUTO: 0.92 K/UL — LOW (ref 1.2–3.4)
LYMPHOCYTES # BLD AUTO: 7.8 % — LOW (ref 20.5–51.1)
MAGNESIUM SERPL-MCNC: 1.7 MG/DL — LOW (ref 1.8–2.4)
MCHC RBC-ENTMCNC: 22.2 PG — LOW (ref 27–31)
MCHC RBC-ENTMCNC: 31.2 G/DL — LOW (ref 32–37)
MCV RBC AUTO: 71.4 FL — LOW (ref 81–99)
MONOCYTES # BLD AUTO: 0.2 K/UL — SIGNIFICANT CHANGE UP (ref 0.1–0.6)
MONOCYTES NFR BLD AUTO: 1.7 % — SIGNIFICANT CHANGE UP (ref 1.7–9.3)
NEUTROPHILS # BLD AUTO: 10.59 K/UL — HIGH (ref 1.4–6.5)
NEUTROPHILS NFR BLD AUTO: 89.9 % — HIGH (ref 42.2–75.2)
NRBC BLD AUTO-RTO: 0 /100 WBCS — SIGNIFICANT CHANGE UP (ref 0–0)
PHOSPHATE SERPL-MCNC: 3.9 MG/DL — SIGNIFICANT CHANGE UP (ref 2.1–4.9)
PLATELET # BLD AUTO: 318 K/UL — SIGNIFICANT CHANGE UP (ref 130–400)
PMV BLD: 9.7 FL — SIGNIFICANT CHANGE UP (ref 7.4–10.4)
POTASSIUM SERPL-MCNC: 4.8 MMOL/L — SIGNIFICANT CHANGE UP (ref 3.5–5)
POTASSIUM SERPL-SCNC: 4.8 MMOL/L — SIGNIFICANT CHANGE UP (ref 3.5–5)
PROT SERPL-MCNC: 6.8 G/DL — SIGNIFICANT CHANGE UP (ref 6–8)
RBC # BLD: 4.72 M/UL — SIGNIFICANT CHANGE UP (ref 4.2–5.4)
RBC # FLD: 17.1 % — HIGH (ref 11.5–14.5)
SODIUM SERPL-SCNC: 139 MMOL/L — SIGNIFICANT CHANGE UP (ref 135–146)
WBC # BLD: 11.78 K/UL — HIGH (ref 4.8–10.8)
WBC # FLD AUTO: 11.78 K/UL — HIGH (ref 4.8–10.8)

## 2025-07-23 PROCEDURE — 88304 TISSUE EXAM BY PATHOLOGIST: CPT

## 2025-07-23 PROCEDURE — S2900 ROBOTIC SURGICAL SYSTEM: CPT

## 2025-07-23 PROCEDURE — C9399: CPT

## 2025-07-23 PROCEDURE — 58573 TLH W/T/O UTERUS OVER 250 G: CPT

## 2025-07-23 PROCEDURE — 88307 TISSUE EXAM BY PATHOLOGIST: CPT | Mod: 26

## 2025-07-23 PROCEDURE — S2900: CPT

## 2025-07-23 PROCEDURE — 88307 TISSUE EXAM BY PATHOLOGIST: CPT

## 2025-07-23 PROCEDURE — 85025 COMPLETE CBC W/AUTO DIFF WBC: CPT

## 2025-07-23 PROCEDURE — 47562 LAPAROSCOPIC CHOLECYSTECTOMY: CPT

## 2025-07-23 PROCEDURE — 84100 ASSAY OF PHOSPHORUS: CPT

## 2025-07-23 PROCEDURE — 80053 COMPREHEN METABOLIC PANEL: CPT

## 2025-07-23 PROCEDURE — 36415 COLL VENOUS BLD VENIPUNCTURE: CPT

## 2025-07-23 PROCEDURE — 57425 LAPAROSCOPY SURG COLPOPEXY: CPT

## 2025-07-23 PROCEDURE — 83735 ASSAY OF MAGNESIUM: CPT

## 2025-07-23 PROCEDURE — 88304 TISSUE EXAM BY PATHOLOGIST: CPT | Mod: 26

## 2025-07-23 RX ORDER — ACETAMINOPHEN 500 MG/5ML
1000 LIQUID (ML) ORAL ONCE
Refills: 0 | Status: DISCONTINUED | OUTPATIENT
Start: 2025-07-23 | End: 2025-07-23

## 2025-07-23 RX ORDER — HEPARIN SODIUM 1000 [USP'U]/ML
5000 INJECTION INTRAVENOUS; SUBCUTANEOUS ONCE
Refills: 0 | Status: COMPLETED | OUTPATIENT
Start: 2025-07-23 | End: 2025-07-23

## 2025-07-23 RX ORDER — SIMETHICONE 80 MG
1 TABLET,CHEWABLE ORAL
Qty: 24 | Refills: 0
Start: 2025-07-23 | End: 2025-07-26

## 2025-07-23 RX ORDER — ACETAMINOPHEN 500 MG/5ML
1000 LIQUID (ML) ORAL ONCE
Refills: 0 | Status: COMPLETED | OUTPATIENT
Start: 2025-07-23 | End: 2025-07-23

## 2025-07-23 RX ORDER — SODIUM CHLORIDE 9 G/1000ML
1000 INJECTION, SOLUTION INTRAVENOUS
Refills: 0 | Status: DISCONTINUED | OUTPATIENT
Start: 2025-07-23 | End: 2025-07-23

## 2025-07-23 RX ORDER — ACETAMINOPHEN 500 MG/5ML
1 LIQUID (ML) ORAL
Qty: 56 | Refills: 0
Start: 2025-07-23 | End: 2025-08-05

## 2025-07-23 RX ORDER — APREPITANT 40 MG/1
40 CAPSULE ORAL ONCE
Refills: 0 | Status: COMPLETED | OUTPATIENT
Start: 2025-07-23 | End: 2025-07-23

## 2025-07-23 RX ORDER — IBUPROFEN 200 MG
1 TABLET ORAL
Qty: 56 | Refills: 0
Start: 2025-07-23 | End: 2025-08-05

## 2025-07-23 RX ORDER — HYDROMORPHONE/SOD CHLOR,ISO/PF 2 MG/10 ML
0.5 SYRINGE (ML) INJECTION
Refills: 0 | Status: DISCONTINUED | OUTPATIENT
Start: 2025-07-23 | End: 2025-07-23

## 2025-07-23 RX ORDER — ACETAMINOPHEN 500 MG/5ML
975 LIQUID (ML) ORAL ONCE
Refills: 0 | Status: COMPLETED | OUTPATIENT
Start: 2025-07-23 | End: 2025-07-23

## 2025-07-23 RX ORDER — GABAPENTIN 400 MG/1
1 CAPSULE ORAL
Qty: 21 | Refills: 0
Start: 2025-07-23 | End: 2025-07-29

## 2025-07-23 RX ORDER — OXYCODONE HYDROCHLORIDE 30 MG/1
5 TABLET ORAL ONCE
Refills: 0 | Status: DISCONTINUED | OUTPATIENT
Start: 2025-07-23 | End: 2025-07-23

## 2025-07-23 RX ORDER — MAGNESIUM SULFATE 500 MG/ML
2 SYRINGE (ML) INJECTION ONCE
Refills: 0 | Status: DISCONTINUED | OUTPATIENT
Start: 2025-07-23 | End: 2025-07-23

## 2025-07-23 RX ORDER — ONDANSETRON HCL/PF 4 MG/2 ML
4 VIAL (ML) INJECTION ONCE
Refills: 0 | Status: DISCONTINUED | OUTPATIENT
Start: 2025-07-23 | End: 2025-07-23

## 2025-07-23 RX ADMIN — Medication 975 MILLIGRAM(S): at 18:30

## 2025-07-23 RX ADMIN — Medication 975 MILLIGRAM(S): at 19:00

## 2025-07-23 RX ADMIN — Medication 0.5 MILLIGRAM(S): at 14:29

## 2025-07-23 RX ADMIN — Medication 0.5 MILLIGRAM(S): at 11:38

## 2025-07-23 RX ADMIN — Medication 0.5 MILLIGRAM(S): at 14:59

## 2025-07-23 RX ADMIN — APREPITANT 40 MILLIGRAM(S): 40 CAPSULE ORAL at 07:16

## 2025-07-23 RX ADMIN — Medication 0.5 MILLIGRAM(S): at 12:08

## 2025-07-23 RX ADMIN — SODIUM CHLORIDE 100 MILLILITER(S): 9 INJECTION, SOLUTION INTRAVENOUS at 12:17

## 2025-07-23 RX ADMIN — Medication 1000 MILLIGRAM(S): at 07:16

## 2025-07-23 RX ADMIN — HEPARIN SODIUM 5000 UNIT(S): 1000 INJECTION INTRAVENOUS; SUBCUTANEOUS at 07:18

## 2025-07-23 RX ADMIN — Medication 0.5 MILLIGRAM(S): at 11:59

## 2025-07-23 NOTE — ASU DISCHARGE PLAN (ADULT/PEDIATRIC) - ASU DC SPECIAL INSTRUCTIONSFT
DIET  - You may resume your normal diet. Eat a well-balanced diet. You may prefer to eat light meals for the first few days after surgery.  Drink plenty of water (6-8 glasses a day).    -Take simethicone (Gas-X) to prevent gas pain every 4-6 hours for the first 3-4 days after surgery.    ACTIVITY:   - No heavy lifting/pushing/pulling for 2 weeks. Do not lift anything more than 10 lbs (such as laundry, groceries, children, pets), vacuum, push heavy doors or grocery carts, etc, for 6 weeks.  - You may climb stairs as tolerated.  -  Do not put anything in the vagina for at least 6 weeks after surgery unless otherwise instructed by your doctor (including tampons, douching, sexual intercourse, etc).  -  No driving for 1 week after surgery and not while taking narcotic pain medication. Drive defensively when you are ready.  -  Avoid sitting or lying in bed for more than 2 hours at a time while you are awake to reduce your risk of blood clots.    WOUND CARE: You have 5 incisions that are covered with surgical glue (Dermabond).  After 24 hours you may get your incisions wet.  Do not submerge in water (no tub baths or pools), showering is OK.  The Dermabond will loosen from the skin and fall off in 5 to 10 days.  Do not apply any ointments, lotions, creams or tape over the Dermabond.    PAIN MANAGEMENT:   Alternate Tylenol and ibuprofen/Motrin (if you are eligible). Each of these medications can be taken every six hours. Try to stagger them so that you are taking something for pain every three hours (ex. Take Motrin at 12:00, Tylenol at 3:00, Motrin at 6:00, etc.) to maximize pain relief.  - Tylenol – 975mg every 6 hours as needed. The maximum dose of Tylenol is 4000 mg in 24 hours.  - Motrin/Ibuprofen - 600-800mg mg every 6 hours as needed (try to take with food). The maximum dose of Motrin/ibuprofen is 2400mg in 24 hours  -  A warm shower, heating pad, and/or walking may help.      WHAT TO EXPECT AT HOME  - Recovery from surgery is generally 1-2 weeks, but sometimes longer for more strenuous activity. It is normal to be very tired during this time.  - It is normal to have some drainage or a small amount of vaginal bleeding after surgery that would require the use of a light pantiliner.   - You may experience gas pain, abdominal swelling, or shoulder pain for 24-72 hours after surgery. This is from the carbon dioxide gas put into your abdomen to better visualize your organs. A warm shower, heating pad, and/or walking may help. Taking simethicone (Gas-X) will help relieve this pain as well.    WHEN TO CALL YOUR DOCTOR:  - Fever (>100.4°F or 38.0°C) or chills  - Incision problems such as redness, warmth, swelling, or foul smelling drainage.  - Severe nausea or persistent vomiting.  - Bright red vaginal bleeding (soaking >1 pad/hour) or foul smelling vaginal drainage.  - Severe pain not relieved with pain medication.  - Pain with urination, cloudy urine, or foul smelling urine.  - Or if you have any other problems or questions.

## 2025-07-23 NOTE — ASU DISCHARGE PLAN (ADULT/PEDIATRIC) - CARE PROVIDER_API CALL
Darlin Palacios ()  Gynecologic Oncology  [address] [place] [address], [address]  [place] [address]  Phone: [phone]  Fax: [phone]  Follow Up Time:     Joaquin Santos)  Surgery (General Surgery)  [address] [place] [address], [address] [place] [address]  Phone: [phone]  Fax: [phone]  Follow Up Time:

## 2025-07-23 NOTE — BRIEF OPERATIVE NOTE - OPERATION/FINDINGS
Robot-assisted cholecystectomy. Critical view of safety achieved. Gallbladder collected in specimen bag, extracted vaginally by GYN team. Hemostasis achieved. Case turned to GYN for GABRIEL-BSO. 
RATLH, BS  Normal vaginal mucosa, normal cervix. Enlarged uterus 22weeeks, with multiple fibroids (weighing 1922g). Normal appearing fallopian tubes, with cysts within paratubal space. Normal appearing ovaries bilaterally. Ureters visualized bilaterally. Vaginal cuff closed in 2 layers with stratafix. Colpopexy performed with Ethibond. Uterus removed through small Pfannenstiel incision.

## 2025-07-23 NOTE — BRIEF OPERATIVE NOTE - NSICDXBRIEFPROCEDURE_GEN_ALL_CORE_FT
PROCEDURES:  Laparoscopic hysterectomy, total, uterus greater than 250 g 23-Jul-2025 10:51:13  Nohemi Tilley  Robot-assisted bilateral salpingectomy 23-Jul-2025 10:51:23  Nohemi Tilley  
PROCEDURES:  Robot-assisted cholecystectomy using da Luciano Xi 23-Jul-2025 09:53:45  Tata Gibson

## 2025-07-23 NOTE — CHART NOTE - NSCHARTNOTEFT_GEN_A_CORE
PACU ANESTHESIA ADMISSION NOTE      Procedure: Robot-assisted cholecystectomy using da Luciano Xi    Laparoscopic hysterectomy, total, uterus greater than 250 g    Robot-assisted bilateral salpingectomy      Post op diagnosis:  Cholecystitis    Fibroid uterus    Abnormal uterine bleeding (AUB)        ____  Intubated  TV:______       Rate: ______      FiO2: ______    __x__  Patent Airway    __x__  Full return of protective reflexes    __x__  Full recovery from anesthesia / back to baseline status    Vitals:  T(C): 36.9 (07-23-25 @ 07:13), Max: 36.9 (07-23-25 @ 06:53)  HR: 60 (07-23-25 @ 07:13) (60 - 60)  BP: 163/95 (07-23-25 @ 07:13) (163/95 - 163/95)  RR: 15 (07-23-25 @ 07:13) (15 - 15)  SpO2: 99% (07-23-25 @ 07:13) (99% - 99%)    Mental Status:  __x__ Awake   ___x__ Alert   _____ Drowsy   _____ Sedated    Nausea/Vomiting:  __x__ NO  ______Yes,   See Post - Op Orders          Pain Scale (0-10):  _____    Treatment: ____ None    __x__ See Post - Op/PCA Orders    Post - Operative Fluids:   ____ Oral   __x__ See Post - Op Orders    Plan: Discharge:   __x__Home       _____Floor     _____Critical Care    _____  Other:_________________    Comments: Patient had smooth intraoperative event, no anesthesia complication.  PACU Vital signs: HR:       47     BP:      161/84          RR:     16        O2 Sat:   98    %     Temp 97.6

## 2025-07-23 NOTE — ASU PREOP CHECKLIST - NOTHING BY MOUTH SINCE
Patient:   DAVION CHAVARRIA            MRN: [id number]            FIN: [id number]              Age:   65 years     Sex:  MALE     :  [birth year]   Associated Diagnoses:   None   Author:   MARIELA JULIAN     DATE OF SERVICE:2020      Subjective   Patient seen and examined  Last Night events reviewed and signout recieved from night intensivist  Chart was reviewed in details  COVID negative X2  low plt and elvated INR  lasix drip started  CXR reviewed, cardiomegaly and diffuse b/l infiltrates, right and left central lines        Objective   Intake and Output   I & O between:  2020 10:54 TO 2020 10:54  Med Dosing Weight:  85.5  kg   2020  24 Hour Intake:   2762.63  ( 32.31 mL/kg )  24 Hour Output:   2770.00           24 Hour Urine/Stool Output:   0.0  24 Hour Balance:   -7.37           24 Hour Urine Output:   2770.00  ( 1.35 mL/kg/hr )     VS/Measurements   Vitals between:   2020 10:54:58   TO   2020 10:54:58                   LAST RESULT MINIMUM MAXIMUM  Temperature 37.8 37.3 38.2  Heart Rate 75 71 84  Respiratory Rate 18 10 27  NISBP           89 89 123  NIDBP           56 56 84  NIMBP           66 66 93  A Line Systolic 106 98 146  A Line Diastolic 53 48 70  A Line Mean 66 60 87  CVP                     11 4 12  SpO2                    97 93 100  FiO2                    0.4 0.4 0.4      Lines and Tubes:   Lines, Tubes, and Drains   Arterial Lines   Left, Radial inserted 3 days ago.  Central Lines   Nontunneled Triple Lumen Left, Internal Jugular inserted 2 days ago.  PIVs   Forearm Right inserted 2 days ago.  Gastric Tubes   Nasogastric dubhof inserted 2 days ago.  Urinary Catheter   inserted on 20 .   .    General:  Alert and oriented, No acute distress.    Eye:  Pupils are equal, round and reactive to light.    Neck:  No carotid bruit, No jugular venous distention.    Respiratory:  Lungs are clear to auscultation, Respirations are non-labored, Breath sounds are  23-Jul-2025 00:00 equal.   Cardiovascular:  Normal rate, Regular rhythm, Good pulses equal in all extremities.   Gastrointestinal:  Soft, Non-tender, Normal bowel sounds.    Integumentary:  Warm, Dry.    Neurologic:  Alert, Oriented, Normal sensory, Normal motor function.      Results Review   General results   Interpretation:   Result title:  XR CHEST 1V  Result status:  Final  Verified by:  NICKI LEON on 04/13/2020 8:54  IMPRESSION:There is accentuation of the bilateral interstitial markings, which appears similar to the findings observed on the examination of the previous dayAlso again noted are residual superimposed bilateral (right side more extensive than left) basilar infiltrative changes.There are endotracheal and feeding tubes.  There is a left-sided defibrillator.  There are degenerative changes of the thoracic spine and bilateral acromioclavicular  joints.  There is slight left ventricular prominence.  There is tortuosity and calcification of the thoracic aorta.There is no demonstrable pneumothorax..             Labs between:  12-APR-2020 10:54 to 13-APR-2020 10:54  CBC:                 WBC  HgB  Hct  Plt  MCV  RDW   13-APR-2020 4.6  (L) 10.9  (L) 32.9  (L) 92  93.5  (H) 17.5   DIFF:                 Seg  Neutroph//ABS  Lymph//ABS  Mono//ABS  EOS/ABS  13-APR-2020 72  // 4.0 // (L) 0.5  // (L) 0.1  // (L) 0.0   BMP:                 Na  Cl  BUN  Glu   13-APR-2020 137  106  (H) 87  (H) 144                              K  CO2  Cr  Ca                              4.1  (L) 18  (H) 4.27  (L) 7.7   BMP:                 Na  Cl  BUN  Glu   12-APR-2020 138  (H) 108  (H) 85  (H) 136                              K  CO2  Cr  Ca                              4.1  (L) 19  (H) 4.07  (L) 7.6   BMP:                 Na  Cl  BUN  Glu   12-APR-2020 138  106  (H) 85  (H) 117                              K  CO2  Cr  Ca                              4.1  (L) 18  (H) 3.99  (L) 7.9   BMP:                 Na  Cl  BUN  Glu   12-APR-2020 137  (H)  108  (H) 82  (H) 107                              K  CO2  Cr  Ca                              4.0  (L) 18  (H) 3.90  (L) 7.6   CMP:                 AST  ALT  AlkPhos  Bili  Albumin   13-APR-2020 (H) 191  (H) 96  96  (H) 6.8  (L) 2.2   Other Chem:             Mg  Phos  Triglycerides  GGTP  DirectBili                           2.4  (H) 6.8         POC GLU:                 Latest Result  Latest Date  Minimum  Min Date  Maximum  Max Date                             (H) 136  13-APR-2020 (H) 136  13-APR-2020 (H) 136              COAG:                 INR  PT  PTT  Ddimer  Fibrinogen    13-APR-2020 3.1  (H) 30.9         Blood Gas:            Ph  PCO2  PO2  BiCarb  BaseExcess   Arterial:  13-APR-2020 (L) 7.31  (L) 34  (L) 79  (L) 17  NOT APPLICABLE                              Ionized Ca  Na  K  HgB  Lactic Acid                              (L) 1.09  136  4.2    (!) 2.1   Venous:  13-APR-2020 (L) 7.27  42  (H) 45  (L) 19  NOT APPLICABLE                              Ionized Ca  Na  K  HgB  Lactic Acid                              (L) 1.08  136  4.4  (L) 10.8                          Impression and Plan   Assessment:  ARF  -on vent- not ready for weaning yet  -AC 28/450/50/5  Acute febrile illness  –Twice COVID 19 test is negative- initial 4/8/20  –had  suspicion due to fever, other inflammatory markers uptrending  Ventricular arrhythmias  –Status post multiple ICD shocks  –Currently on amiodarone drip  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

## 2025-07-23 NOTE — BRIEF OPERATIVE NOTE - NSICDXBRIEFPREOP_GEN_ALL_CORE_FT
PRE-OP DIAGNOSIS:  Fibroid uterus 23-Jul-2025 10:51:33  Nohemi Tilley  Abnormal uterine bleeding (AUB) 23-Jul-2025 10:51:38  Nohemi Tilley

## 2025-07-23 NOTE — PRE-ANESTHESIA EVALUATION ADULT - NSANTHOSAYNRD_GEN_A_CORE
No. BHRAATI screening performed.  STOP BANG Legend: 0-2 = LOW Risk; 3-4 = INTERMEDIATE Risk; 5-8 = HIGH Risk

## 2025-07-23 NOTE — BRIEF OPERATIVE NOTE - NSICDXBRIEFPOSTOP_GEN_ALL_CORE_FT
POST-OP DIAGNOSIS:  Cholecystitis 23-Jul-2025 09:54:08  Tata Gibson  
POST-OP DIAGNOSIS:  Abnormal uterine bleeding (AUB) 23-Jul-2025 10:51:50  Nohemi Tilley  Fibroid uterus 23-Jul-2025 10:51:42  Nohemi Tilley

## 2025-07-23 NOTE — ASU PREOP CHECKLIST - ORDERS/MEDICATION ADMINISTRATION RECORD ON CHART
acet 1gram and hep 5000 sq given as ordered/done acet 1gram emend 40mg and hep 5000 sq given as ordered/done

## 2025-07-23 NOTE — PRE-ANESTHESIA EVALUATION ADULT - NSANTHDIETYNSD_GEN_ALL_CORE
Yes Patient with recent reported fall at home. Likely in setting of EtOH based on history. Unlikely arrhythmogenic given EKG NSR.  - PT

## 2025-07-23 NOTE — ASU DISCHARGE PLAN (ADULT/PEDIATRIC) - FINANCIAL ASSISTANCE
[facility] provides services at a reduced cost to those who are determined to be eligible through [facility]’s financial assistance program. Information regarding [facility]’s financial assistance program can be found by going to [email or web address] or by calling [phone].

## 2025-07-25 LAB — SURGICAL PATHOLOGY STUDY: SIGNIFICANT CHANGE UP

## 2025-07-30 DIAGNOSIS — I10 ESSENTIAL (PRIMARY) HYPERTENSION: ICD-10-CM

## 2025-07-30 DIAGNOSIS — Z91.040 LATEX ALLERGY STATUS: ICD-10-CM

## 2025-07-30 DIAGNOSIS — N72 INFLAMMATORY DISEASE OF CERVIX UTERI: ICD-10-CM

## 2025-07-30 DIAGNOSIS — D25.9 LEIOMYOMA OF UTERUS, UNSPECIFIED: ICD-10-CM

## 2025-07-30 DIAGNOSIS — N93.9 ABNORMAL UTERINE AND VAGINAL BLEEDING, UNSPECIFIED: ICD-10-CM

## 2025-07-30 DIAGNOSIS — K80.10 CALCULUS OF GALLBLADDER WITH CHRONIC CHOLECYSTITIS WITHOUT OBSTRUCTION: ICD-10-CM

## 2025-07-30 DIAGNOSIS — N88.8 OTHER SPECIFIED NONINFLAMMATORY DISORDERS OF CERVIX UTERI: ICD-10-CM

## 2025-07-30 DIAGNOSIS — Z98.84 BARIATRIC SURGERY STATUS: ICD-10-CM

## 2025-07-30 DIAGNOSIS — Z87.891 PERSONAL HISTORY OF NICOTINE DEPENDENCE: ICD-10-CM

## 2025-08-04 ENCOUNTER — APPOINTMENT (OUTPATIENT)
Dept: SURGERY | Facility: CLINIC | Age: 40
End: 2025-08-04
Payer: COMMERCIAL

## 2025-08-04 VITALS
OXYGEN SATURATION: 98 % | DIASTOLIC BLOOD PRESSURE: 86 MMHG | BODY MASS INDEX: 35.2 KG/M2 | TEMPERATURE: 97 F | HEIGHT: 66 IN | HEART RATE: 82 BPM | WEIGHT: 219 LBS | SYSTOLIC BLOOD PRESSURE: 138 MMHG

## 2025-08-04 PROCEDURE — 99024 POSTOP FOLLOW-UP VISIT: CPT

## 2025-08-07 ENCOUNTER — APPOINTMENT (OUTPATIENT)
Dept: GYNECOLOGIC ONCOLOGY | Facility: CLINIC | Age: 40
End: 2025-08-07
Payer: COMMERCIAL

## 2025-08-07 PROCEDURE — 99024 POSTOP FOLLOW-UP VISIT: CPT
